# Patient Record
Sex: MALE | Race: BLACK OR AFRICAN AMERICAN
[De-identification: names, ages, dates, MRNs, and addresses within clinical notes are randomized per-mention and may not be internally consistent; named-entity substitution may affect disease eponyms.]

---

## 2018-07-22 ENCOUNTER — HOSPITAL ENCOUNTER (EMERGENCY)
Dept: HOSPITAL 92 - ERS | Age: 28
Discharge: HOME | End: 2018-07-22
Payer: SELF-PAY

## 2018-07-22 DIAGNOSIS — Z71.6: ICD-10-CM

## 2018-07-22 DIAGNOSIS — K80.20: Primary | ICD-10-CM

## 2018-07-22 LAB
ALBUMIN SERPL BCG-MCNC: 4.2 G/DL (ref 3.5–5)
ALP SERPL-CCNC: 86 U/L (ref 40–150)
ALT SERPL W P-5'-P-CCNC: 28 U/L (ref 8–55)
ANION GAP SERPL CALC-SCNC: 11 MMOL/L (ref 10–20)
AST SERPL-CCNC: 21 U/L (ref 5–34)
BASOPHILS # BLD AUTO: 0 THOU/UL (ref 0–0.2)
BASOPHILS NFR BLD AUTO: 0.6 % (ref 0–1)
BILIRUB SERPL-MCNC: 0.5 MG/DL (ref 0.2–1.2)
BUN SERPL-MCNC: 11 MG/DL (ref 8.9–20.6)
CALCIUM SERPL-MCNC: 9.9 MG/DL (ref 7.8–10.44)
CHLORIDE SERPL-SCNC: 105 MMOL/L (ref 98–107)
CO2 SERPL-SCNC: 26 MMOL/L (ref 22–29)
CREAT CL PREDICTED SERPL C-G-VRATE: 0 ML/MIN (ref 70–130)
EOSINOPHIL # BLD AUTO: 0.1 THOU/UL (ref 0–0.7)
EOSINOPHIL NFR BLD AUTO: 1.4 % (ref 0–10)
GLOBULIN SER CALC-MCNC: 3.7 G/DL (ref 2.4–3.5)
GLUCOSE SERPL-MCNC: 128 MG/DL (ref 70–105)
HGB BLD-MCNC: 14.8 G/DL (ref 14–18)
LIPASE SERPL-CCNC: 32 U/L (ref 8–78)
LYMPHOCYTES # BLD: 2.4 THOU/UL (ref 1.2–3.4)
LYMPHOCYTES NFR BLD AUTO: 28.1 % (ref 21–51)
MCH RBC QN AUTO: 27.1 PG (ref 27–31)
MCV RBC AUTO: 81.9 FL (ref 78–98)
MONOCYTES # BLD AUTO: 0.7 THOU/UL (ref 0.11–0.59)
MONOCYTES NFR BLD AUTO: 8.4 % (ref 0–10)
NEUTROPHILS # BLD AUTO: 5.2 THOU/UL (ref 1.4–6.5)
NEUTROPHILS NFR BLD AUTO: 61.5 % (ref 42–75)
PLATELET # BLD AUTO: 309 THOU/UL (ref 130–400)
POTASSIUM SERPL-SCNC: 3.7 MMOL/L (ref 3.5–5.1)
RBC # BLD AUTO: 5.45 MILL/UL (ref 4.7–6.1)
SODIUM SERPL-SCNC: 138 MMOL/L (ref 136–145)
SP GR UR STRIP: 1.03 (ref 1–1.04)
WBC # BLD AUTO: 8.4 THOU/UL (ref 4.8–10.8)

## 2018-07-22 PROCEDURE — 80053 COMPREHEN METABOLIC PANEL: CPT

## 2018-07-22 PROCEDURE — 74177 CT ABD & PELVIS W/CONTRAST: CPT

## 2018-07-22 PROCEDURE — 96361 HYDRATE IV INFUSION ADD-ON: CPT

## 2018-07-22 PROCEDURE — 85025 COMPLETE CBC W/AUTO DIFF WBC: CPT

## 2018-07-22 PROCEDURE — 96374 THER/PROPH/DIAG INJ IV PUSH: CPT

## 2018-07-22 PROCEDURE — 87086 URINE CULTURE/COLONY COUNT: CPT

## 2018-07-22 PROCEDURE — 83690 ASSAY OF LIPASE: CPT

## 2018-07-22 PROCEDURE — 96372 THER/PROPH/DIAG INJ SC/IM: CPT

## 2018-07-22 PROCEDURE — 99406 BEHAV CHNG SMOKING 3-10 MIN: CPT

## 2018-07-22 PROCEDURE — 81003 URINALYSIS AUTO W/O SCOPE: CPT

## 2018-07-22 PROCEDURE — 96375 TX/PRO/DX INJ NEW DRUG ADDON: CPT

## 2018-07-23 NOTE — CT
CT ABDOMEN AND PELVIS WITH IV CONTRAST:

 

Technique: Multiple contiguous axial images were obtained of the abdomen and pelvis with IV enhanceme
nt. 

 

Indications: Abdominal pain. Nausea. Vomiting. 

 

FINDINGS: 

Lung bases are clear. 

 

Liver, spleen, and pancreas unremarkable. 

 

There are several gallstones noted in the gallbladder. There may be a stone in the neck of the gallbl
adder. Cholesterol stones are not identified on CT. Consider further evaluation with gallbladder ultr
asound. 

 

Adrenal glands and kidneys unremarkable. 

 

Small bowel loops appear normal. Appendix appears normal. Colon is unremarkable. Aorta is normal kim
rigoberto. 

 

IMPRESSION: 

1. Cholelithiasis is identified. 

2. Otherwise no acute process.

 

POS: [facility]

## 2023-02-01 ENCOUNTER — HOSPITAL ENCOUNTER (EMERGENCY)
Dept: HOSPITAL 92 - ERS | Age: 33
Discharge: HOME | End: 2023-02-01
Payer: SELF-PAY

## 2023-02-01 DIAGNOSIS — M79.672: Primary | ICD-10-CM

## 2023-04-05 ENCOUNTER — HOSPITAL ENCOUNTER (EMERGENCY)
Dept: HOSPITAL 92 - CSHERS | Age: 33
Discharge: HOME | End: 2023-04-05
Payer: SELF-PAY

## 2023-04-05 DIAGNOSIS — M79.672: Primary | ICD-10-CM

## 2023-08-30 ENCOUNTER — HOSPITAL ENCOUNTER (EMERGENCY)
Dept: HOSPITAL 92 - ERS | Age: 33
Discharge: HOME | End: 2023-08-30
Payer: SELF-PAY

## 2023-08-30 DIAGNOSIS — R60.0: Primary | ICD-10-CM

## 2023-08-30 LAB
ALBUMIN SERPL BCG-MCNC: 4 G/DL (ref 3.5–5)
ALP SERPL-CCNC: 85 U/L (ref 40–110)
ALT SERPL W P-5'-P-CCNC: 15 U/L (ref 8–55)
ANION GAP SERPL CALC-SCNC: 10 MMOL/L (ref 10–20)
AST SERPL-CCNC: 17 U/L (ref 5–34)
BILIRUB SERPL-MCNC: 0.7 MG/DL (ref 0.2–1.2)
BUN SERPL-MCNC: 8 MG/DL (ref 8.9–20.6)
CALCIUM SERPL-MCNC: 9.4 MG/DL (ref 7.8–10.44)
CHLORIDE SERPL-SCNC: 108 MMOL/L (ref 98–107)
CO2 SERPL-SCNC: 25 MMOL/L (ref 22–29)
CREAT CL PREDICTED SERPL C-G-VRATE: 0 ML/MIN (ref 70–130)
GLOBULIN SER CALC-MCNC: 4.1 G/DL (ref 2.4–3.5)
GLUCOSE SERPL-MCNC: 82 MG/DL (ref 70–105)
POTASSIUM SERPL-SCNC: 3.9 MMOL/L (ref 3.5–5.1)
SODIUM SERPL-SCNC: 139 MMOL/L (ref 136–145)

## 2023-08-30 PROCEDURE — 80053 COMPREHEN METABOLIC PANEL: CPT

## 2023-08-30 PROCEDURE — 36415 COLL VENOUS BLD VENIPUNCTURE: CPT

## 2023-08-30 PROCEDURE — 99283 EMERGENCY DEPT VISIT LOW MDM: CPT

## 2024-11-16 ENCOUNTER — HOSPITAL ENCOUNTER (EMERGENCY)
Dept: HOSPITAL 92 - ERS | Age: 34
Discharge: HOME | End: 2024-11-16
Payer: SELF-PAY

## 2024-11-16 DIAGNOSIS — M79.642: Primary | ICD-10-CM

## 2024-11-16 LAB
CAUTI INDICATIONS FOR CULTURE: (no result)
GLUCOSE UR STRIP-MCNC: 200 MG/DL
RBC UR QL AUTO: (no result) HPF (ref 0–3)
SP GR UR STRIP: 1.02 (ref 1–1.04)
WBC UR QL AUTO: (no result) HPF (ref 0–3)

## 2024-11-16 PROCEDURE — 81001 URINALYSIS AUTO W/SCOPE: CPT

## 2024-11-16 PROCEDURE — 87491 CHLMYD TRACH DNA AMP PROBE: CPT

## 2024-11-16 PROCEDURE — 87591 N.GONORRHOEAE DNA AMP PROB: CPT

## 2024-11-16 PROCEDURE — 99283 EMERGENCY DEPT VISIT LOW MDM: CPT

## 2024-12-11 ENCOUNTER — HOSPITAL ENCOUNTER (EMERGENCY)
Dept: HOSPITAL 92 - ERS | Age: 34
Discharge: HOME | End: 2024-12-11
Payer: SELF-PAY

## 2024-12-11 DIAGNOSIS — R04.0: Primary | ICD-10-CM

## 2024-12-11 PROCEDURE — 99282 EMERGENCY DEPT VISIT SF MDM: CPT

## 2025-01-21 ENCOUNTER — HOSPITAL ENCOUNTER (EMERGENCY)
Dept: HOSPITAL 92 - CSHERS | Age: 35
Discharge: HOME | End: 2025-01-21
Payer: COMMERCIAL

## 2025-01-21 DIAGNOSIS — M62.830: ICD-10-CM

## 2025-01-21 DIAGNOSIS — M54.50: Primary | ICD-10-CM

## 2025-01-21 PROCEDURE — 96372 THER/PROPH/DIAG INJ SC/IM: CPT

## 2025-01-21 PROCEDURE — 99283 EMERGENCY DEPT VISIT LOW MDM: CPT
